# Patient Record
Sex: FEMALE | Race: WHITE | Employment: PART TIME | URBAN - METROPOLITAN AREA
[De-identification: names, ages, dates, MRNs, and addresses within clinical notes are randomized per-mention and may not be internally consistent; named-entity substitution may affect disease eponyms.]

---

## 2017-09-18 ENCOUNTER — HOSPITAL ENCOUNTER (OUTPATIENT)
Dept: DIABETES SERVICES | Age: 37
Discharge: HOME | End: 2017-09-18

## 2017-09-18 ENCOUNTER — HOSPITAL ENCOUNTER (OUTPATIENT)
Dept: OTHER | Age: 37
Discharge: HOME | End: 2017-09-30
Attending: FAMILY MEDICINE | Admitting: FAMILY MEDICINE

## 2017-09-18 DIAGNOSIS — O24.419 GESTATIONAL DIABETES MELLITUS (GDM) IN THIRD TRIMESTER, GESTATIONAL DIABETES METHOD OF CONTROL UNSPECIFIED: Primary | ICD-10-CM

## 2017-09-18 ASSESSMENT — PATIENT HEALTH QUESTIONNAIRE - PHQ9
SUM OF ALL RESPONSES TO PHQ9 QUESTIONS 1 & 2: 3
SUM OF ALL RESPONSES TO PHQ QUESTIONS 1-9: 3
2. FEELING DOWN, DEPRESSED OR HOPELESS: 1
1. LITTLE INTEREST OR PLEASURE IN DOING THINGS: 2

## 2017-09-25 ENCOUNTER — HOSPITAL ENCOUNTER (OUTPATIENT)
Dept: ONCOLOGY | Age: 37
Discharge: HOME | End: 2017-09-25
Attending: OBSTETRICS & GYNECOLOGY | Admitting: OBSTETRICS & GYNECOLOGY

## 2017-09-25 ENCOUNTER — HOSPITAL ENCOUNTER (OUTPATIENT)
Dept: DIABETES SERVICES | Age: 37
Discharge: HOME | End: 2017-09-25

## 2017-09-25 VITALS
DIASTOLIC BLOOD PRESSURE: 79 MMHG | HEART RATE: 87 BPM | RESPIRATION RATE: 87 BRPM | WEIGHT: 202 LBS | BODY MASS INDEX: 29.92 KG/M2 | TEMPERATURE: 98.4 F | HEIGHT: 69 IN | SYSTOLIC BLOOD PRESSURE: 124 MMHG

## 2017-09-25 DIAGNOSIS — O24.419 GESTATIONAL DIABETES MELLITUS (GDM) IN THIRD TRIMESTER, GESTATIONAL DIABETES METHOD OF CONTROL UNSPECIFIED: Primary | ICD-10-CM

## 2017-09-25 LAB — RHIG LOT NUMBER: NORMAL

## 2017-09-25 ASSESSMENT — PAIN - FUNCTIONAL ASSESSMENT: PAIN_FUNCTIONAL_ASSESSMENT: 0-10

## 2017-11-01 ENCOUNTER — HOSPITAL ENCOUNTER (OUTPATIENT)
Dept: OTHER | Age: 37
Discharge: HOME | End: 2017-11-30
Attending: FAMILY MEDICINE | Admitting: FAMILY MEDICINE

## 2017-11-15 ENCOUNTER — HOSPITAL ENCOUNTER (OUTPATIENT)
Dept: OBGYN | Age: 37
Discharge: HOME | End: 2017-11-30
Attending: OBSTETRICS & GYNECOLOGY | Admitting: OBSTETRICS & GYNECOLOGY

## 2017-12-01 ENCOUNTER — HOSPITAL ENCOUNTER (OUTPATIENT)
Dept: OTHER | Age: 37
Discharge: HOME | End: 2017-12-31
Attending: OBSTETRICS & GYNECOLOGY | Admitting: OBSTETRICS & GYNECOLOGY

## 2019-06-04 ENCOUNTER — OFFICE VISIT (OUTPATIENT)
Dept: FAMILY MEDICINE CLINIC | Age: 39
End: 2019-06-04
Payer: COMMERCIAL

## 2019-06-04 VITALS
RESPIRATION RATE: 16 BRPM | HEART RATE: 76 BPM | DIASTOLIC BLOOD PRESSURE: 86 MMHG | HEIGHT: 69 IN | WEIGHT: 209.2 LBS | SYSTOLIC BLOOD PRESSURE: 126 MMHG | BODY MASS INDEX: 30.98 KG/M2 | OXYGEN SATURATION: 98 % | TEMPERATURE: 99.1 F

## 2019-06-04 DIAGNOSIS — G47.00 INSOMNIA, UNSPECIFIED TYPE: ICD-10-CM

## 2019-06-04 DIAGNOSIS — Z76.89 ENCOUNTER TO ESTABLISH CARE: Primary | ICD-10-CM

## 2019-06-04 DIAGNOSIS — J01.90 ACUTE BACTERIAL SINUSITIS: ICD-10-CM

## 2019-06-04 DIAGNOSIS — F41.9 ANXIETY: ICD-10-CM

## 2019-06-04 DIAGNOSIS — B96.89 ACUTE BACTERIAL SINUSITIS: ICD-10-CM

## 2019-06-04 PROCEDURE — 99204 OFFICE O/P NEW MOD 45 MIN: CPT | Performed by: NURSE PRACTITIONER

## 2019-06-04 RX ORDER — NORETHINDRONE ACETATE AND ETHINYL ESTRADIOL 1MG-20(21)
1 KIT ORAL DAILY
COMMUNITY

## 2019-06-04 RX ORDER — CITALOPRAM 40 MG/1
40 TABLET ORAL DAILY
Qty: 30 TABLET | Refills: 3 | Status: SHIPPED | OUTPATIENT
Start: 2019-06-04 | End: 2019-10-18 | Stop reason: SDUPTHER

## 2019-06-04 RX ORDER — AMOXICILLIN AND CLAVULANATE POTASSIUM 875; 125 MG/1; MG/1
1 TABLET, FILM COATED ORAL 2 TIMES DAILY
Qty: 20 TABLET | Refills: 0 | Status: SHIPPED | OUTPATIENT
Start: 2019-06-04 | End: 2019-06-14

## 2019-06-04 RX ORDER — HYDROXYZINE HYDROCHLORIDE 25 MG/1
25-50 TABLET, FILM COATED ORAL NIGHTLY
Qty: 30 TABLET | Refills: 0 | Status: SHIPPED | OUTPATIENT
Start: 2019-06-04 | End: 2019-07-04

## 2019-06-04 ASSESSMENT — ENCOUNTER SYMPTOMS
CONSTIPATION: 0
DIARRHEA: 0
ABDOMINAL PAIN: 0
EYE REDNESS: 0
EYE DISCHARGE: 0
SHORTNESS OF BREATH: 0
COLOR CHANGE: 0
EYE PAIN: 0
WHEEZING: 0
COUGH: 1
NAUSEA: 1
SORE THROAT: 1
RHINORRHEA: 1
VOMITING: 0

## 2019-06-04 ASSESSMENT — PATIENT HEALTH QUESTIONNAIRE - PHQ9
SUM OF ALL RESPONSES TO PHQ QUESTIONS 1-9: 0
2. FEELING DOWN, DEPRESSED OR HOPELESS: 0
SUM OF ALL RESPONSES TO PHQ9 QUESTIONS 1 & 2: 0
1. LITTLE INTEREST OR PLEASURE IN DOING THINGS: 0
SUM OF ALL RESPONSES TO PHQ QUESTIONS 1-9: 0

## 2019-06-04 NOTE — PROGRESS NOTES
(CELEXA) 40 MG tablet Take 1 tablet by mouth daily Yes JORDYN Leon CNP   amoxicillin-clavulanate (AUGMENTIN) 875-125 MG per tablet Take 1 tablet by mouth 2 times daily for 10 days Yes JORDYN Leon CNP   albuterol (PROVENTIL HFA;VENTOLIN HFA) 108 (90 BASE) MCG/ACT inhaler Inhale 2 puffs into the lungs every 6 hours as needed for Wheezing.  Yes Historical Provider, MD        Allergies   Allergen Reactions    No Known Allergies        Past Medical History:   Diagnosis Date    Anxiety     Asthma     Migraine     past hx due to BCP    Scoliosis        Past Surgical History:   Procedure Laterality Date     SECTION  2017    TONSILLECTOMY      TONSILLECTOMY         Social History     Socioeconomic History    Marital status:      Spouse name: Not on file    Number of children: 0    Years of education: Not on file    Highest education level: Not on file   Occupational History    Occupation:      Employer: [employer]    Financial resource strain: Not on file    Food insecurity:     Worry: Not on file     Inability: Not on file    Transportation needs:     Medical: Not on file     Non-medical: Not on file   Tobacco Use    Smoking status: Never Smoker    Smokeless tobacco: Never Used   Substance and Sexual Activity    Alcohol use: No     Comment: 1-2 X per year    Drug use: No    Sexual activity: Yes     Partners: Male   Lifestyle    Physical activity:     Days per week: Not on file     Minutes per session: Not on file    Stress: Not on file   Relationships    Social connections:     Talks on phone: Not on file     Gets together: Not on file     Attends Mandaeism service: Not on file     Active member of club or organization: Not on file     Attends meetings of clubs or organizations: Not on file     Relationship status: Not on file    Intimate partner violence:     Fear of current or ex partner: Not on file     Emotionally abused: Not on file     Physically abused: Not on file     Forced sexual activity: Not on file   Other Topics Concern    Not on file   Social History Narrative    Not on file        Family History   Problem Relation Age of Onset    Early Death Paternal Uncle     Early Death Paternal Grandfather     High Blood Pressure Mother     High Blood Pressure Father     Stroke Maternal Aunt     Diabetes Maternal Uncle     Cancer Maternal Grandmother         lymphoma    Diabetes Maternal Grandfather        Vitals:    06/04/19 0959   BP: 126/86   Pulse: 76   Resp: 16   Temp: 99.1 °F (37.3 °C)   SpO2: 98%   Weight: 209 lb 3.2 oz (94.9 kg)   Height: 5' 9\" (1.753 m)     Estimated body mass index is 30.89 kg/m² as calculated from the following:    Height as of this encounter: 5' 9\" (1.753 m). Weight as of this encounter: 209 lb 3.2 oz (94.9 kg). Physical Exam   Constitutional: She is oriented to person, place, and time. She appears well-developed and well-nourished. HENT:   Head: Normocephalic. Right Ear: Hearing, tympanic membrane, external ear and ear canal normal.   Left Ear: Hearing, tympanic membrane, external ear and ear canal normal.   Nose: Mucosal edema present. Right sinus exhibits maxillary sinus tenderness. Right sinus exhibits no frontal sinus tenderness. Left sinus exhibits no maxillary sinus tenderness and no frontal sinus tenderness. Mouth/Throat: Uvula is midline and mucous membranes are normal. Posterior oropharyngeal erythema present. No oropharyngeal exudate or posterior oropharyngeal edema. No tonsillar exudate. Cardiovascular: Normal rate and regular rhythm. Pulmonary/Chest: Effort normal and breath sounds normal. She has no wheezes. She exhibits no tenderness. Abdominal: Soft. Bowel sounds are normal.   Neurological: She is alert and oriented to person, place, and time. Skin: Skin is warm and dry. Psychiatric: She has a normal mood and affect.  Her behavior is normal.   Vitals reviewed. ASSESSMENT/PLAN:  1. Encounter to establish care  See below    2. Acute bacterial sinusitis  Will treat with Augmentin, side effects discussed. - amoxicillin-clavulanate (AUGMENTIN) 875-125 MG per tablet; Take 1 tablet by mouth 2 times daily for 10 days  Dispense: 20 tablet; Refill: 0    3. Anxiety  Stable, continue celexa daily  - citalopram (CELEXA) 40 MG tablet; Take 1 tablet by mouth daily  Dispense: 30 tablet; Refill: 3    4. Insomnia, unspecified type  Will try hydroyzine for sleep. Will follow up in 1 month. - hydrOXYzine (ATARAX) 25 MG tablet; Take 1-2 tablets by mouth nightly  Dispense: 30 tablet; Refill: 0      Return in about 1 month (around 7/4/2019) for wellness visit. An  electronic signature was used to authenticate this note.     --JORDYN Lr - CNP on 6/4/2019 at 11:16 AM

## 2019-07-11 ENCOUNTER — OFFICE VISIT (OUTPATIENT)
Dept: FAMILY MEDICINE CLINIC | Age: 39
End: 2019-07-11
Payer: COMMERCIAL

## 2019-07-11 VITALS
HEART RATE: 75 BPM | BODY MASS INDEX: 31.19 KG/M2 | OXYGEN SATURATION: 98 % | DIASTOLIC BLOOD PRESSURE: 86 MMHG | SYSTOLIC BLOOD PRESSURE: 126 MMHG | WEIGHT: 211.2 LBS | RESPIRATION RATE: 16 BRPM

## 2019-07-11 DIAGNOSIS — G47.00 INSOMNIA, UNSPECIFIED TYPE: ICD-10-CM

## 2019-07-11 DIAGNOSIS — Z00.00 WELLNESS EXAMINATION: Primary | ICD-10-CM

## 2019-07-11 PROBLEM — F41.9 ANXIETY AND DEPRESSION: Status: ACTIVE | Noted: 2019-07-11

## 2019-07-11 PROBLEM — F32.A ANXIETY AND DEPRESSION: Status: ACTIVE | Noted: 2019-07-11

## 2019-07-11 LAB
A/G RATIO: 1.1 (ref 1.1–2.2)
ALBUMIN SERPL-MCNC: 4.1 G/DL (ref 3.4–5)
ALP BLD-CCNC: 80 U/L (ref 40–129)
ALT SERPL-CCNC: 19 U/L (ref 10–40)
ANION GAP SERPL CALCULATED.3IONS-SCNC: 14 MMOL/L (ref 3–16)
AST SERPL-CCNC: 34 U/L (ref 15–37)
BILIRUB SERPL-MCNC: 0.3 MG/DL (ref 0–1)
BUN BLDV-MCNC: 10 MG/DL (ref 7–20)
CALCIUM SERPL-MCNC: 9.4 MG/DL (ref 8.3–10.6)
CHLORIDE BLD-SCNC: 104 MMOL/L (ref 99–110)
CHOLESTEROL, TOTAL: 179 MG/DL (ref 0–199)
CO2: 23 MMOL/L (ref 21–32)
CREAT SERPL-MCNC: 0.6 MG/DL (ref 0.6–1.1)
GFR AFRICAN AMERICAN: >60
GFR NON-AFRICAN AMERICAN: >60
GLOBULIN: 3.7 G/DL
GLUCOSE BLD-MCNC: 112 MG/DL (ref 70–99)
HDLC SERPL-MCNC: 45 MG/DL (ref 40–60)
LDL CHOLESTEROL CALCULATED: 105 MG/DL
POTASSIUM SERPL-SCNC: 4.1 MMOL/L (ref 3.5–5.1)
SODIUM BLD-SCNC: 141 MMOL/L (ref 136–145)
TOTAL PROTEIN: 7.8 G/DL (ref 6.4–8.2)
TRIGL SERPL-MCNC: 143 MG/DL (ref 0–150)
VLDLC SERPL CALC-MCNC: 29 MG/DL

## 2019-07-11 PROCEDURE — 99395 PREV VISIT EST AGE 18-39: CPT | Performed by: NURSE PRACTITIONER

## 2019-07-11 PROCEDURE — 36415 COLL VENOUS BLD VENIPUNCTURE: CPT | Performed by: NURSE PRACTITIONER

## 2019-07-11 RX ORDER — TRAZODONE HYDROCHLORIDE 50 MG/1
50 TABLET ORAL NIGHTLY PRN
Qty: 30 TABLET | Refills: 0 | Status: SHIPPED | OUTPATIENT
Start: 2019-07-11 | End: 2019-08-09

## 2019-07-11 ASSESSMENT — ENCOUNTER SYMPTOMS
RESPIRATORY NEGATIVE: 1
GASTROINTESTINAL NEGATIVE: 1

## 2019-07-12 DIAGNOSIS — R73.09 ELEVATED GLUCOSE: Primary | ICD-10-CM

## 2019-07-12 DIAGNOSIS — R73.09 ELEVATED GLUCOSE: ICD-10-CM

## 2019-07-12 LAB
ESTIMATED AVERAGE GLUCOSE: 125.5 MG/DL
HBA1C MFR BLD: 6 %

## 2019-07-18 ENCOUNTER — PATIENT MESSAGE (OUTPATIENT)
Dept: FAMILY MEDICINE CLINIC | Age: 39
End: 2019-07-18

## 2019-07-18 RX ORDER — AMITRIPTYLINE HYDROCHLORIDE 10 MG/1
10 TABLET, FILM COATED ORAL NIGHTLY
Qty: 30 TABLET | Refills: 0 | Status: SHIPPED | OUTPATIENT
Start: 2019-07-18 | End: 2019-08-09 | Stop reason: DRUGHIGH

## 2019-07-29 ENCOUNTER — PATIENT MESSAGE (OUTPATIENT)
Dept: FAMILY MEDICINE CLINIC | Age: 39
End: 2019-07-29

## 2019-08-08 ENCOUNTER — PATIENT MESSAGE (OUTPATIENT)
Dept: FAMILY MEDICINE CLINIC | Age: 39
End: 2019-08-08

## 2019-08-09 RX ORDER — AMITRIPTYLINE HYDROCHLORIDE 50 MG/1
50 TABLET, FILM COATED ORAL NIGHTLY
Qty: 30 TABLET | Refills: 5 | Status: SHIPPED | OUTPATIENT
Start: 2019-08-09 | End: 2019-09-10 | Stop reason: DRUGHIGH

## 2019-09-10 ENCOUNTER — OFFICE VISIT (OUTPATIENT)
Dept: FAMILY MEDICINE CLINIC | Age: 39
End: 2019-09-10
Payer: COMMERCIAL

## 2019-09-10 VITALS
RESPIRATION RATE: 16 BRPM | WEIGHT: 216.4 LBS | HEART RATE: 92 BPM | DIASTOLIC BLOOD PRESSURE: 88 MMHG | BODY MASS INDEX: 31.96 KG/M2 | SYSTOLIC BLOOD PRESSURE: 132 MMHG | OXYGEN SATURATION: 98 %

## 2019-09-10 DIAGNOSIS — M65.312 TRIGGER THUMB OF LEFT HAND: Primary | ICD-10-CM

## 2019-09-10 DIAGNOSIS — G47.00 INSOMNIA, UNSPECIFIED TYPE: ICD-10-CM

## 2019-09-10 PROCEDURE — 99213 OFFICE O/P EST LOW 20 MIN: CPT | Performed by: NURSE PRACTITIONER

## 2019-09-10 RX ORDER — DICLOFENAC SODIUM 75 MG/1
75 TABLET, DELAYED RELEASE ORAL 2 TIMES DAILY
Qty: 20 TABLET | Refills: 0 | Status: SHIPPED | OUTPATIENT
Start: 2019-09-10 | End: 2020-01-16 | Stop reason: CLARIF

## 2019-09-10 RX ORDER — AMITRIPTYLINE HYDROCHLORIDE 50 MG/1
75 TABLET, FILM COATED ORAL NIGHTLY
Qty: 45 TABLET | Refills: 2 | Status: SHIPPED | OUTPATIENT
Start: 2019-09-10 | End: 2020-01-31 | Stop reason: SDUPTHER

## 2019-09-10 ASSESSMENT — ENCOUNTER SYMPTOMS: RESPIRATORY NEGATIVE: 1

## 2019-09-25 ENCOUNTER — OFFICE VISIT (OUTPATIENT)
Dept: ORTHOPEDIC SURGERY | Age: 39
End: 2019-09-25
Payer: COMMERCIAL

## 2019-09-25 VITALS — HEIGHT: 69 IN | WEIGHT: 216.49 LBS | BODY MASS INDEX: 32.07 KG/M2

## 2019-09-25 DIAGNOSIS — M79.645 FINGER PAIN, LEFT: Primary | ICD-10-CM

## 2019-09-25 DIAGNOSIS — M65.312 TRIGGER FINGER OF LEFT THUMB: ICD-10-CM

## 2019-09-25 PROCEDURE — 20550 NJX 1 TENDON SHEATH/LIGAMENT: CPT | Performed by: ORTHOPAEDIC SURGERY

## 2019-09-25 PROCEDURE — 99243 OFF/OP CNSLTJ NEW/EST LOW 30: CPT | Performed by: ORTHOPAEDIC SURGERY

## 2019-09-25 RX ORDER — BETAMETHASONE SODIUM PHOSPHATE AND BETAMETHASONE ACETATE 3; 3 MG/ML; MG/ML
12 INJECTION, SUSPENSION INTRA-ARTICULAR; INTRALESIONAL; INTRAMUSCULAR; SOFT TISSUE ONCE
Status: COMPLETED | OUTPATIENT
Start: 2019-09-25 | End: 2019-09-25

## 2019-09-25 RX ADMIN — BETAMETHASONE SODIUM PHOSPHATE AND BETAMETHASONE ACETATE 12 MG: 3; 3 INJECTION, SUSPENSION INTRA-ARTICULAR; INTRALESIONAL; INTRAMUSCULAR; SOFT TISSUE at 09:56

## 2019-09-25 NOTE — PROGRESS NOTES
Social connections:     Talks on phone: None     Gets together: None     Attends Worship service: None     Active member of club or organization: None     Attends meetings of clubs or organizations: None     Relationship status: None    Intimate partner violence:     Fear of current or ex partner: None     Emotionally abused: None     Physically abused: None     Forced sexual activity: None   Other Topics Concern    None   Social History Narrative    None     Current Outpatient Medications   Medication Sig Dispense Refill    amitriptyline (ELAVIL) 50 MG tablet Take 1.5 tablets by mouth nightly 45 tablet 2    norethindrone-ethinyl estradiol (JUNEL FE 1/20) 1-20 MG-MCG per tablet Take 1 tablet by mouth daily      citalopram (CELEXA) 40 MG tablet Take 1 tablet by mouth daily 30 tablet 3    albuterol (PROVENTIL HFA;VENTOLIN HFA) 108 (90 BASE) MCG/ACT inhaler Inhale 2 puffs into the lungs every 6 hours as needed for Wheezing.  diclofenac (VOLTAREN) 75 MG EC tablet Take 1 tablet by mouth 2 times daily for 10 days 20 tablet 0     No current facility-administered medications for this visit. Allergies   Allergen Reactions    No Known Allergies        ROS:  ROS neg except for positives in HPI    PHYSICAL EXAMINATION:   Gen/Psych: Examination reveals a pleasant individual in no acute distress. The patient is oriented to time, place and person. The patient's mood and affect are appropriate. Lymph: The lymphatic examination bilaterally reveals all areas to be without enlargement or induration. Skin intact without lymphadenopathy, discoloration, or abnormal temperature. Vascular: There is intact, symmetric circulation in both upper extremities.     Musculoskeletal       Cervical spine, shoulders, elbows, wrist:  satisfactory baseline range of motion, strength, and stability bilaterally        right thumb:                               moderate  tenderness at the A1 pulley moderate triggering with flexion and     extension. All other fingers/thumbs:   No tenderness at A1 pulley, no triggering with                                                   flexion/extension, and no locking    Neurological:  Sensation is subjectively normal in hands bilaterally    Radiology:         Additional Studies:    IMPRESSION AND PLAN: Left trigger thumb    I discussed the entity of trigger finger with the patient. I fully outlined the mechanics behind the triggering and locking and appropriate conservative and surgical options. All their questions were answered fully. At this point the patient is symptomatic, and injection was recommended. Patient was in agreement, hopeful to avoid surgery at this stage. The risks and benefits of cortisone injection were discussed thoroughly. Risks discussed included infection, adverse drug reaction, increased pain post-injection, skin de-pigmentation, fatty atrophy, and persistent clinical symptoms despite injection. Use of ethyl chloride spray during injection to decrease injection site pain was discussed. The injection was given after cleansing the skin with alcohol. Left trigger thumb and flexor tendon sheath was injected with 1 cc of 1% lidocaine without epinephrine 1 cc of Celestone without difficulty. The patient tolerated the injection well and a Band-aid was placed. I would recommend some thumb immobilization at night. Follow-up if symptoms not resolved. We may consider repeat cortisone injection or proceeding with left trigger thumb surgical release. We appreciate patient referral    All questions and concerns were addressed today. Patient is in agreement with the plan. Christina Lopez MD  Hand & Upper Extremity Surgery  [address] [place] [address]  A partner of [facility] ([facility])        Please note that this transcription was created using voice recognition software.   Any errors are unintentional and may be

## 2019-10-18 DIAGNOSIS — F41.9 ANXIETY: ICD-10-CM

## 2019-10-18 RX ORDER — CITALOPRAM 40 MG/1
40 TABLET ORAL DAILY
Qty: 30 TABLET | Refills: 2 | Status: SHIPPED | OUTPATIENT
Start: 2019-10-18 | End: 2020-01-16 | Stop reason: SDUPTHER

## 2020-01-16 ENCOUNTER — OFFICE VISIT (OUTPATIENT)
Dept: FAMILY MEDICINE CLINIC | Age: 40
End: 2020-01-16
Payer: COMMERCIAL

## 2020-01-16 VITALS
WEIGHT: 221 LBS | OXYGEN SATURATION: 98 % | HEART RATE: 90 BPM | TEMPERATURE: 98.3 F | SYSTOLIC BLOOD PRESSURE: 120 MMHG | DIASTOLIC BLOOD PRESSURE: 88 MMHG | BODY MASS INDEX: 32.62 KG/M2

## 2020-01-16 LAB — HBA1C MFR BLD: 6.4 %

## 2020-01-16 PROCEDURE — 99214 OFFICE O/P EST MOD 30 MIN: CPT | Performed by: NURSE PRACTITIONER

## 2020-01-16 PROCEDURE — 83036 HEMOGLOBIN GLYCOSYLATED A1C: CPT | Performed by: NURSE PRACTITIONER

## 2020-01-16 RX ORDER — CITALOPRAM 40 MG/1
40 TABLET ORAL DAILY
Qty: 30 TABLET | Refills: 2 | Status: SHIPPED | OUTPATIENT
Start: 2020-01-16 | End: 2020-05-13 | Stop reason: SDUPTHER

## 2020-01-16 ASSESSMENT — ENCOUNTER SYMPTOMS
WHEEZING: 0
SHORTNESS OF BREATH: 0

## 2020-01-16 NOTE — PROGRESS NOTES
Patient: Bubba Knowles is a 46 y.o. female who presents today with the following Chief Complaint(s):  Chief Complaint   Patient presents with    6 Month Follow-Up         HPI   April is a 45 yo female who is here for 6 month follow up for anxiety and A1C. Anxiety: taking celexa 40mg- working well for her mood  Insomnia: taking amitriptyline as needed  A1C last time was 6.0- has not been eating well  Had gestational diabetes     Current Outpatient Medications   Medication Sig Dispense Refill    citalopram (CELEXA) 40 MG tablet Take 1 tablet by mouth daily 30 tablet 2    amitriptyline (ELAVIL) 50 MG tablet Take 1.5 tablets by mouth nightly 45 tablet 2    albuterol (PROVENTIL HFA;VENTOLIN HFA) 108 (90 BASE) MCG/ACT inhaler Inhale 2 puffs into the lungs every 6 hours as needed for Wheezing.  norethindrone-ethinyl estradiol (JUNEL FE 1/20) 1-20 MG-MCG per tablet Take 1 tablet by mouth daily       No current facility-administered medications for this visit. Patient's past medical history, surgical history, family history, medications,  andallergies  were all reviewed and updated as appropriate today. Review of Systems   Eyes: Negative for visual disturbance. Respiratory: Negative for shortness of breath and wheezing. Cardiovascular: Negative for chest pain and palpitations. Endocrine: Negative for polydipsia, polyphagia and polyuria. Genitourinary: Negative for dysuria, frequency and urgency. Neurological: Negative for numbness. Psychiatric/Behavioral: Positive for sleep disturbance. Negative for dysphoric mood and suicidal ideas. The patient is not nervous/anxious. Physical Exam  Vitals signs and nursing note reviewed. Constitutional:       Appearance: She is well-developed. HENT:      Head: Normocephalic and atraumatic. Eyes:      Conjunctiva/sclera: Conjunctivae normal.   Neck:      Musculoskeletal: Normal range of motion and neck supple.    Cardiovascular: Rate and Rhythm: Normal rate and regular rhythm. Heart sounds: Normal heart sounds. No murmur. Pulmonary:      Effort: Pulmonary effort is normal. No respiratory distress. Breath sounds: Normal breath sounds. No wheezing or rales. Musculoskeletal: Normal range of motion. Lymphadenopathy:      Cervical: No cervical adenopathy. Skin:     General: Skin is warm and dry. Neurological:      General: No focal deficit present. Mental Status: She is alert and oriented to person, place, and time. Deep Tendon Reflexes: Reflexes are normal and symmetric. Psychiatric:         Mood and Affect: Mood normal.         Behavior: Behavior normal.         Thought Content: Thought content normal.         Judgment: Judgment normal.       Vitals:    01/16/20 1021   BP: 120/88   Pulse:    Temp:    SpO2:        Assessment:  Encounter Diagnoses   Name Primary?  Anxiety Yes    Insomnia, unspecified type     Prediabetes        Plan:  1. Anxiety    - citalopram (CELEXA) 40 MG tablet; Take 1 tablet by mouth daily  Dispense: 30 tablet; Refill: 2    2. Insomnia, unspecified type  Amitriptyline as needed    3. Prediabetes    - POCT glycosylated hemoglobin (Hb A1C) 6.4  Discussed that she really needs to watch diet- 6.5 is diagnostic for diabetes  Gave information on healthy eating  Discussed if it goes up next time it will be suggested that she start medication          Return in about 3 months (around 4/16/2020) for check A1C.

## 2020-01-22 ENCOUNTER — OFFICE VISIT (OUTPATIENT)
Dept: ORTHOPEDIC SURGERY | Age: 40
End: 2020-01-22
Payer: COMMERCIAL

## 2020-01-22 VITALS — WEIGHT: 220.9 LBS | HEIGHT: 69 IN | BODY MASS INDEX: 32.72 KG/M2

## 2020-01-22 PROCEDURE — 20550 NJX 1 TENDON SHEATH/LIGAMENT: CPT | Performed by: ORTHOPAEDIC SURGERY

## 2020-01-22 PROCEDURE — 99213 OFFICE O/P EST LOW 20 MIN: CPT | Performed by: ORTHOPAEDIC SURGERY

## 2020-01-22 RX ORDER — BETAMETHASONE SODIUM PHOSPHATE AND BETAMETHASONE ACETATE 3; 3 MG/ML; MG/ML
12 INJECTION, SUSPENSION INTRA-ARTICULAR; INTRALESIONAL; INTRAMUSCULAR; SOFT TISSUE ONCE
Status: COMPLETED | OUTPATIENT
Start: 2020-01-22 | End: 2020-01-22

## 2020-01-22 RX ADMIN — BETAMETHASONE SODIUM PHOSPHATE AND BETAMETHASONE ACETATE 12 MG: 3; 3 INJECTION, SUSPENSION INTRA-ARTICULAR; INTRALESIONAL; INTRAMUSCULAR; SOFT TISSUE at 15:01

## 2020-01-22 NOTE — PROGRESS NOTES
Chief Complaint   Patient presents with    Follow-up     left thumb: trigger  would like an injection        HISTORY OF PRESENT ILLNESS:  April M Darcie Corley is a 46 y.o.  patient here for repeat for discomfort and triggering of the left thumb. We provided a cortisone injection approximately 4 months ago. Symptoms have returned. No new injury reported. ROS:  ROS neg     Past medical history is reviewed again today, no changes to report    PHYSICAL EXAMINATION:  Patient is alert and pleasant, in no acute distress. The affected extremity is examined today. Left thumb reveals clicking over the A1 pulley without locking. Mild  weakness. No prior injection site change. Thumb is warm and sensate      IMPRESSION AND PLAN: Left trigger thumb  We will continue with our current care plan. We offered a repeat cortisone injection today rather than surgery, per her request.  The risks and benefits of cortisone injection were discussed thoroughly. Risks discussed included infection, adverse drug reaction, increased pain post-injection, skin de-pigmentation, fatty atrophy, and persistent clinical symptoms despite injection. Use of ethyl chloride spray during injection to decrease injection site pain was discussed. The injection was given after cleansing the skin with alcohol. Left trigger thumb and flexor tendon sheath was injected with 1 cc of 1% lidocaine without epinephrine and 1 cc of Celestone without difficulty. Nice filling of the tendon sheath. The patient tolerated the injection well and a Band-aid was placed. Follow-up if symptoms do not resolve  All questions and concerns were addressed today. Patient is in agreement with the plan. Ghazal Fuentes MD  Hand & Upper Extremity Surgery  [address]  A partner of [facility] ([facility])        Please note that this transcription was created using voice recognition software.   Any errors are unintentional and may

## 2020-01-31 ENCOUNTER — PATIENT MESSAGE (OUTPATIENT)
Dept: FAMILY MEDICINE CLINIC | Age: 40
End: 2020-01-31

## 2020-01-31 RX ORDER — AMITRIPTYLINE HYDROCHLORIDE 50 MG/1
75 TABLET, FILM COATED ORAL NIGHTLY
Qty: 45 TABLET | Refills: 5 | Status: SHIPPED | OUTPATIENT
Start: 2020-01-31 | End: 2020-07-14 | Stop reason: DRUGHIGH

## 2020-01-31 NOTE — TELEPHONE ENCOUNTER
From: Jazmine Urrutia  To: Jesica Wooten, APRN - CNP  Sent: 1/31/2020 1:08 PM EST  Subject: Non-Urgent Medical Question    Mariusz Leigh,   I need a refill on the Amitriptyline. Thank you.

## 2020-04-06 ENCOUNTER — PATIENT MESSAGE (OUTPATIENT)
Dept: FAMILY MEDICINE CLINIC | Age: 40
End: 2020-04-06

## 2020-04-06 NOTE — TELEPHONE ENCOUNTER
From: Jazmine Urrutia  To: JORDYN Rivero - CNP  Sent: 4/6/2020 5:30 PM EDT  Subject: Non-Urgent Medical Question    I have an appointment on April 16 to test my A1C. Since the office is closed, should I go to another location or schedule a later date?    Thanks,  April

## 2020-04-09 ENCOUNTER — TELEPHONE (OUTPATIENT)
Dept: FAMILY MEDICINE CLINIC | Age: 40
End: 2020-04-09

## 2020-04-13 ENCOUNTER — TELEPHONE (OUTPATIENT)
Dept: FAMILY MEDICINE CLINIC | Age: 40
End: 2020-04-13

## 2020-04-16 ENCOUNTER — TELEMEDICINE (OUTPATIENT)
Dept: FAMILY MEDICINE CLINIC | Age: 40
End: 2020-04-16
Payer: COMMERCIAL

## 2020-04-16 PROCEDURE — 99213 OFFICE O/P EST LOW 20 MIN: CPT | Performed by: NURSE PRACTITIONER

## 2020-04-16 RX ORDER — HYDROXYZINE HYDROCHLORIDE 25 MG/1
25 TABLET, FILM COATED ORAL NIGHTLY
Qty: 30 TABLET | Refills: 0 | Status: SHIPPED | OUTPATIENT
Start: 2020-04-16 | End: 2020-05-20 | Stop reason: SDUPTHER

## 2020-04-16 ASSESSMENT — ENCOUNTER SYMPTOMS
GASTROINTESTINAL NEGATIVE: 1
RESPIRATORY NEGATIVE: 1

## 2020-04-16 NOTE — PROGRESS NOTES
2020    TELEHEALTH EVALUATION -- Audio/Visual (During ROYRD-64 public health emergency)    HPI:    Jazmine Urrutia (:  1980) has requested an audio/video evaluation for the following concern(s):    Patient has concerns with not sleeping well at night. She has been on amitriptyline 100mg for a couple of months now. She used to work 3rd shift and she thinks that may be contributing to her sleeping issues. She has only been sleeping for about 2 hours and then she is awake the rest of the night. She has tried Trazodone in the past and she did not tolerate it well. She has been taking naps in the morning when her  gets up with her daughter. Review of Systems   Constitutional: Positive for fatigue. HENT: Negative. Respiratory: Negative. Cardiovascular: Negative. Gastrointestinal: Negative. Musculoskeletal: Negative. Neurological: Negative. Psychiatric/Behavioral: Positive for sleep disturbance. Negative for self-injury and suicidal ideas. The patient is not nervous/anxious. Prior to Visit Medications    Medication Sig Taking? Authorizing Provider   hydrOXYzine (ATARAX) 25 MG tablet Take 1 tablet by mouth nightly Yes JORDYN Covington CNP   amitriptyline (ELAVIL) 50 MG tablet Take 1.5 tablets by mouth nightly  JORDYN Lubin CNP   citalopram (CELEXA) 40 MG tablet Take 1 tablet by mouth daily  JORDYN Martinez CNP   norethindrone-ethinyl estradiol (JUNEL FE 1/20) 1-20 MG-MCG per tablet Take 1 tablet by mouth daily  Historical Provider, MD   albuterol (PROVENTIL HFA;VENTOLIN HFA) 108 (90 BASE) MCG/ACT inhaler Inhale 2 puffs into the lungs every 6 hours as needed for Wheezing. Historical Provider, MD       Social History     Tobacco Use    Smoking status: Never Smoker    Smokeless tobacco: Never Used   Substance Use Topics    Alcohol use: No     Comment: 1-2 X per year    Drug use:  No            PHYSICAL EXAMINATION:  [ INSTRUCTIONS:  \"[x]\" Indicates a positive item  \"[]\" Indicates a negative item  -- DELETE ALL ITEMS NOT EXAMINED]  Vital Signs: (As obtained by patient/caregiver or practitioner observation)    Blood pressure-  Heart rate-    Respiratory rate-    Temperature-  Pulse oximetry-     Constitutional: [x] Appears well-developed and well-nourished [x] No apparent distress      [] Abnormal-   Mental status  [x] Alert and awake  [x] Oriented to person/place/time []Able to follow commands      Eyes:  EOM    []  Normal  [] Abnormal-  Sclera  []  Normal  [] Abnormal -         Discharge []  None visible  [] Abnormal -    HENT:   [x] Normocephalic, atraumatic. [] Abnormal   [x] Mouth/Throat: Mucous membranes are moist.     External Ears [] Normal  [] Abnormal-     Neck: [x] No visualized mass     Pulmonary/Chest: [x] Respiratory effort normal.  [x] No visualized signs of difficulty breathing or respiratory distress        [] Abnormal-      Musculoskeletal:   [] Normal gait with no signs of ataxia         [] Normal range of motion of neck        [] Abnormal-       Neurological:        [] No Facial Asymmetry (Cranial nerve 7 motor function) (limited exam to video visit)          [] No gaze palsy        [] Abnormal-         Skin:        [x] No significant exanthematous lesions or discoloration noted on facial skin         [] Abnormal-            Psychiatric:       [x] Normal Affect [] No Hallucinations        [] Abnormal-     Other pertinent observable physical exam findings-     ASSESSMENT/PLAN:  1. Insomnia, unspecified type  Will continue with amitriptyline nightly and add hydroxyzine nightly. Discussed good sleep hygiene and limiting caffeine intake after lunch. Will follow up again in office in June. - hydrOXYzine (ATARAX) 25 MG tablet; Take 1 tablet by mouth nightly  Dispense: 30 tablet;  Refill: 0          April M Henrietta Sanchez is a 46 y.o. female being evaluated by a Virtual Visit (video visit) encounter to address concerns as through a video synchronous discussion virtually to substitute for in-person clinic visit. I discussed with the patient the nature of our telehealth visits via interactive/real-time audio/video that:   - I would evaluate the patient and recommend diagnostics and treatments based on my assessment   - Our sessions are not being recorded and that personal health information is protected   - Our team would provide follow up care in person if/when the patient needs it.

## 2020-05-13 ENCOUNTER — PATIENT MESSAGE (OUTPATIENT)
Dept: FAMILY MEDICINE CLINIC | Age: 40
End: 2020-05-13

## 2020-05-13 RX ORDER — CITALOPRAM 40 MG/1
40 TABLET ORAL DAILY
Qty: 30 TABLET | Refills: 2 | Status: SHIPPED | OUTPATIENT
Start: 2020-05-13 | End: 2020-07-14 | Stop reason: SDUPTHER

## 2020-05-18 ENCOUNTER — OFFICE VISIT (OUTPATIENT)
Dept: ORTHOPEDIC SURGERY | Age: 40
End: 2020-05-18
Payer: COMMERCIAL

## 2020-05-18 VITALS — HEIGHT: 69 IN | BODY MASS INDEX: 32.72 KG/M2 | WEIGHT: 220.9 LBS

## 2020-05-18 PROCEDURE — 99213 OFFICE O/P EST LOW 20 MIN: CPT | Performed by: ORTHOPAEDIC SURGERY

## 2020-05-18 PROCEDURE — 20550 NJX 1 TENDON SHEATH/LIGAMENT: CPT | Performed by: ORTHOPAEDIC SURGERY

## 2020-05-18 RX ORDER — LIDOCAINE HYDROCHLORIDE 10 MG/ML
20 INJECTION, SOLUTION INFILTRATION; PERINEURAL ONCE
Status: COMPLETED | OUTPATIENT
Start: 2020-05-18 | End: 2020-05-18

## 2020-05-18 RX ORDER — BETAMETHASONE SODIUM PHOSPHATE AND BETAMETHASONE ACETATE 3; 3 MG/ML; MG/ML
1 INJECTION, SUSPENSION INTRA-ARTICULAR; INTRALESIONAL; INTRAMUSCULAR; SOFT TISSUE ONCE
Status: COMPLETED | OUTPATIENT
Start: 2020-05-18 | End: 2020-05-18

## 2020-05-18 RX ADMIN — BETAMETHASONE SODIUM PHOSPHATE AND BETAMETHASONE ACETATE 1.02 MG: 3; 3 INJECTION, SUSPENSION INTRA-ARTICULAR; INTRALESIONAL; INTRAMUSCULAR; SOFT TISSUE at 15:49

## 2020-05-18 RX ADMIN — LIDOCAINE HYDROCHLORIDE 20 ML: 10 INJECTION, SOLUTION INFILTRATION; PERINEURAL at 15:49

## 2020-05-20 ENCOUNTER — PATIENT MESSAGE (OUTPATIENT)
Dept: FAMILY MEDICINE CLINIC | Age: 40
End: 2020-05-20

## 2020-05-20 RX ORDER — HYDROXYZINE HYDROCHLORIDE 25 MG/1
50 TABLET, FILM COATED ORAL NIGHTLY
Qty: 60 TABLET | Refills: 3 | Status: SHIPPED | OUTPATIENT
Start: 2020-05-20 | End: 2020-06-19

## 2020-07-01 ENCOUNTER — TELEPHONE (OUTPATIENT)
Dept: FAMILY MEDICINE CLINIC | Age: 40
End: 2020-07-01

## 2020-07-01 NOTE — TELEPHONE ENCOUNTER
[facility] received a call from:    Name of Caller: Patient    Best contact number: [phone]    Reason for call: Patient needs a visit to have A1C checked, please call patient to schedule

## 2020-07-14 ENCOUNTER — OFFICE VISIT (OUTPATIENT)
Dept: FAMILY MEDICINE CLINIC | Age: 40
End: 2020-07-14
Payer: COMMERCIAL

## 2020-07-14 VITALS
WEIGHT: 228 LBS | TEMPERATURE: 97.5 F | SYSTOLIC BLOOD PRESSURE: 130 MMHG | OXYGEN SATURATION: 97 % | DIASTOLIC BLOOD PRESSURE: 88 MMHG | HEART RATE: 119 BPM | BODY MASS INDEX: 33.65 KG/M2

## 2020-07-14 PROCEDURE — 99214 OFFICE O/P EST MOD 30 MIN: CPT | Performed by: NURSE PRACTITIONER

## 2020-07-14 RX ORDER — AMITRIPTYLINE HYDROCHLORIDE 50 MG/1
50 TABLET, FILM COATED ORAL NIGHTLY
COMMUNITY
End: 2020-07-22

## 2020-07-14 RX ORDER — ZOLPIDEM TARTRATE 5 MG/1
5 TABLET ORAL NIGHTLY PRN
Qty: 30 TABLET | Refills: 0 | Status: SHIPPED | OUTPATIENT
Start: 2020-07-14 | End: 2020-08-14 | Stop reason: SDUPTHER

## 2020-07-14 RX ORDER — CITALOPRAM 40 MG/1
40 TABLET ORAL DAILY
Qty: 30 TABLET | Refills: 5 | Status: SHIPPED | OUTPATIENT
Start: 2020-07-14 | End: 2021-02-26

## 2020-07-14 ASSESSMENT — ENCOUNTER SYMPTOMS
RESPIRATORY NEGATIVE: 1
SHORTNESS OF BREATH: 0
COUGH: 0
GASTROINTESTINAL NEGATIVE: 1
WHEEZING: 0

## 2020-07-14 NOTE — PATIENT INSTRUCTIONS
Stop Hydroxyzine and decrease amitriptyline to 50 mg. Start Ambien 5 mg nightly. Follow up in 1 month.

## 2020-07-14 NOTE — PROGRESS NOTES
Social History     Tobacco Use    Smoking status: Never Smoker    Smokeless tobacco: Never Used   Substance Use Topics    Alcohol use: No     Comment: 1-2 X per year        Vitals:    07/14/20 1045   BP: 130/88   Pulse: 119   Temp: 97.5 °F (36.4 °C)   TempSrc: Temporal   SpO2: 97%   Weight: 228 lb (103.4 kg)     Estimated body mass index is 33.65 kg/m² as calculated from the following:    Height as of 5/18/20: 5' 9.02\" (1.753 m). Weight as of this encounter: 228 lb (103.4 kg). Physical Exam  Vitals signs reviewed. Neck:     Cardiovascular:      Rate and Rhythm: Normal rate and regular rhythm. Heart sounds: Normal heart sounds. Pulmonary:      Effort: Pulmonary effort is normal.      Breath sounds: Normal breath sounds. Abdominal:      General: Bowel sounds are normal.   Musculoskeletal:         General: Swelling (Plus 1 in bilateral lower extremities) present. Skin:     General: Skin is warm and dry. Neurological:      Mental Status: She is alert and oriented to person, place, and time. Psychiatric:         Mood and Affect: Mood normal.         Behavior: Behavior normal.         ASSESSMENT/PLAN:  1. Pre-diabetes  Will check blood work  - Hemoglobin A1C  - Comprehensive Metabolic Panel    2. Insomnia, unspecified type  Will start abien, she has tried multiple other medications. Side effects discussed. Patient verbalized her understanding. Will follow up in 1 months.   - zolpidem (AMBIEN) 5 MG tablet; Take 1 tablet by mouth nightly as needed for Sleep for up to 30 days. Dispense: 30 tablet; Refill: 0    3. Localized swelling, mass or lump of neck  Likely just fatty tissue deposit. Will further assess with  Ultrasound. - US HEAD NECK SOFT TISSUE THYROID; Future    4. Bilateral lower extremity edema  Educated patient to elevate legs during the day when she can and try compression stockings.        Follow up in 1 month    An electronic signature was used to authenticate this note.    --JORDYN Jenkins - CNP on 7/14/2020 at 1:08 PM

## 2020-07-15 LAB
A/G RATIO: 0.9 (ref 1.1–2.2)
ALBUMIN SERPL-MCNC: 3.7 G/DL (ref 3.4–5)
ALP BLD-CCNC: 97 U/L (ref 40–129)
ALT SERPL-CCNC: 48 U/L (ref 10–40)
ANION GAP SERPL CALCULATED.3IONS-SCNC: 15 MMOL/L (ref 3–16)
AST SERPL-CCNC: 88 U/L (ref 15–37)
BILIRUB SERPL-MCNC: 0.5 MG/DL (ref 0–1)
BUN BLDV-MCNC: 9 MG/DL (ref 7–20)
CALCIUM SERPL-MCNC: 9.1 MG/DL (ref 8.3–10.6)
CHLORIDE BLD-SCNC: 100 MMOL/L (ref 99–110)
CO2: 24 MMOL/L (ref 21–32)
CREAT SERPL-MCNC: 0.6 MG/DL (ref 0.6–1.1)
ESTIMATED AVERAGE GLUCOSE: 197.3 MG/DL
GFR AFRICAN AMERICAN: >60
GFR NON-AFRICAN AMERICAN: >60
GLOBULIN: 4.1 G/DL
GLUCOSE BLD-MCNC: 287 MG/DL (ref 70–99)
HBA1C MFR BLD: 8.5 %
POTASSIUM SERPL-SCNC: 3.6 MMOL/L (ref 3.5–5.1)
SODIUM BLD-SCNC: 139 MMOL/L (ref 136–145)
TOTAL PROTEIN: 7.8 G/DL (ref 6.4–8.2)

## 2020-07-15 RX ORDER — BLOOD PRESSURE TEST KIT
KIT MISCELLANEOUS
Qty: 100 EACH | Refills: 2 | Status: SHIPPED | OUTPATIENT
Start: 2020-07-15

## 2020-07-15 RX ORDER — LANCETS 30 GAUGE
EACH MISCELLANEOUS
Qty: 100 EACH | Refills: 2 | Status: SHIPPED | OUTPATIENT
Start: 2020-07-15

## 2020-07-15 RX ORDER — GLUCOSAMINE HCL/CHONDROITIN SU 500-400 MG
CAPSULE ORAL
Qty: 100 STRIP | Refills: 2 | Status: SHIPPED | OUTPATIENT
Start: 2020-07-15 | End: 2020-10-15

## 2020-07-23 ENCOUNTER — HOSPITAL ENCOUNTER (OUTPATIENT)
Dept: ULTRASOUND IMAGING | Age: 40
Discharge: HOME | End: 2020-07-23
Payer: COMMERCIAL

## 2020-07-23 PROCEDURE — 76536 US EXAM OF HEAD AND NECK: CPT

## 2020-08-14 ENCOUNTER — OFFICE VISIT (OUTPATIENT)
Dept: FAMILY MEDICINE CLINIC | Age: 40
End: 2020-08-14
Payer: COMMERCIAL

## 2020-08-14 VITALS
OXYGEN SATURATION: 97 % | WEIGHT: 215 LBS | HEART RATE: 94 BPM | TEMPERATURE: 97.1 F | SYSTOLIC BLOOD PRESSURE: 120 MMHG | BODY MASS INDEX: 31.74 KG/M2 | DIASTOLIC BLOOD PRESSURE: 82 MMHG

## 2020-08-14 PROCEDURE — 99213 OFFICE O/P EST LOW 20 MIN: CPT | Performed by: NURSE PRACTITIONER

## 2020-08-14 PROCEDURE — 3052F HG A1C>EQUAL 8.0%<EQUAL 9.0%: CPT | Performed by: NURSE PRACTITIONER

## 2020-08-14 RX ORDER — ZOLPIDEM TARTRATE 5 MG/1
5 TABLET ORAL NIGHTLY PRN
Qty: 30 TABLET | Refills: 2 | Status: SHIPPED | OUTPATIENT
Start: 2020-08-14 | End: 2020-09-13

## 2020-08-14 RX ORDER — ZOLPIDEM TARTRATE 5 MG/1
5 TABLET ORAL NIGHTLY PRN
COMMUNITY

## 2020-08-14 ASSESSMENT — ENCOUNTER SYMPTOMS
WHEEZING: 0
COUGH: 0
RESPIRATORY NEGATIVE: 1
SHORTNESS OF BREATH: 0
GASTROINTESTINAL NEGATIVE: 1

## 2020-08-14 NOTE — PROGRESS NOTES
2020 SD Urrutia (:  1980) is a 46 y.o. female, here for evaluation of the following medical concerns:    HPI   Insomnia- patient is not sure if the Ambien is helping or not. She states when she took the full pill it made her too drowsy the next day. When she was taking half a tablet she does not think it was working. Diabetes- she is concerned about her levels. She is taking her metformin twice daily. She has a history of gestational diabetes. Review of Systems   Constitutional: Negative. HENT: Negative. Respiratory: Negative. Negative for cough, shortness of breath and wheezing. Cardiovascular: Negative. Negative for chest pain and palpitations. Gastrointestinal: Negative. Musculoskeletal: Negative. Skin: Negative. Neurological: Negative. Psychiatric/Behavioral: Positive for sleep disturbance. Negative for self-injury and suicidal ideas. The patient is nervous/anxious. Prior to Visit Medications    Medication Sig Taking? Authorizing Provider   zolpidem (AMBIEN) 5 MG tablet Take 1 tablet by mouth nightly as needed for Sleep for up to 30 days. Yes JORDYN Christian CNP   zolpidem (AMBIEN) 5 MG tablet Take 5 mg by mouth nightly as needed for Sleep. Yes Historical Provider, MD   amitriptyline (ELAVIL) 50 MG tablet TAKE 1 AND 1/2 TABLETS BY MOUTH EVERY NIGHT Yes JORDYN Aparicio CNP   metFORMIN (GLUCOPHAGE) 500 MG tablet Take 1 tablet by mouth 2 times daily (with meals) Yes JORDYN Christian CNP   Alcohol Swabs PADS To check glucose 2  Times daily Yes JORDYN Christian CNP   blood glucose monitor kit and supplies Test 2 times a day & as needed for symptoms of irregular blood glucose. Yes JORDYN Christian CNP   blood glucose monitor strips Test 2 times a day & as needed for symptoms of irregular blood glucose.  Yes JORDYN Christian CNP   Lancets MISC To test glucose 2 times daily Yes JORDYN Christian CNP citalopram (CELEXA) 40 MG tablet Take 1 tablet by mouth daily Yes JORDYN Zepeda CNP   hydrocortisone 2.5 % cream Apply topically 2 times daily. Yes JORDYN Zepeda CNP   albuterol sulfate  (90 Base) MCG/ACT inhaler Inhale 2 puffs into the lungs every 6 hours as needed for Wheezing Yes JORDYN Zepeda CNP   norethindrone-ethinyl estradiol ([place] FE 1/20) 1-20 MG-MCG per tablet Take 1 tablet by mouth daily Yes Historical Provider, MD        Social History     Tobacco Use    Smoking status: Never Smoker    Smokeless tobacco: Never Used   Substance Use Topics    Alcohol use: No     Comment: 1-2 X per year        Vitals:    08/14/20 1050 08/14/20 1114   BP: (!) 144/90 120/82   Pulse: 94    Temp: 97.1 °F (36.2 °C)    TempSrc: Temporal    SpO2: 97%    Weight: 215 lb (97.5 kg)      Estimated body mass index is 31.74 kg/m² as calculated from the following:    Height as of 5/18/20: 5' 9.02\" (1.753 m). Weight as of this encounter: 215 lb (97.5 kg). Physical Exam  Vitals signs reviewed. Cardiovascular:      Rate and Rhythm: Normal rate and regular rhythm. Heart sounds: Normal heart sounds. Pulmonary:      Effort: Pulmonary effort is normal.      Breath sounds: Normal breath sounds. Abdominal:      General: Bowel sounds are normal.   Skin:     General: Skin is warm and dry. Neurological:      Mental Status: She is alert and oriented to person, place, and time. Psychiatric:         Mood and Affect: Mood normal.         Speech: Speech normal.         Behavior: Behavior normal.         ASSESSMENT/PLAN:  1. Chronic insomnia  Refill given of ambien, patient advised to give it some time for her body to adjust to it. If no improvement then she should see sleep medicine for further intervention. - zolpidem (AMBIEN) 5 MG tablet; Take 1 tablet by mouth nightly as needed for Sleep for up to 30 days. Dispense: 30 tablet;  Refill: 2  - Christen Duarte MD, Sleep Medicine, [facility]    2. Type 2 diabetes mellitus without complication, without long-term current use of insulin (HCC)  Continue current medications. Home BS readings look good. Return in about 3 months (around 11/14/2020) for follow up diabetes. An electronic signature was used to authenticate this note.     --JORDYN Kaufman - CNP on 8/14/2020 at 1:11 PM

## 2020-09-16 ENCOUNTER — PATIENT MESSAGE (OUTPATIENT)
Dept: FAMILY MEDICINE CLINIC | Age: 40
End: 2020-09-16

## 2020-09-16 RX ORDER — DEXTROMETHORPHAN HYDROBROMIDE AND PROMETHAZINE HYDROCHLORIDE 15; 6.25 MG/5ML; MG/5ML
5 SYRUP ORAL 4 TIMES DAILY PRN
Qty: 118 ML | Refills: 0 | Status: SHIPPED | OUTPATIENT
Start: 2020-09-16 | End: 2020-11-16

## 2020-09-16 NOTE — TELEPHONE ENCOUNTER
From: April SD Urrutia  To: Ginny Dodson, JORDYN - CNP  Sent: 9/16/2020 11:02 AM EDT  Subject: Prescription Question    Hi Sayra,  I've got a cold. It started out as a sore throat, now its coughing. I cough so much that Im having problems trying to go to sleep. I've tried using that Tussin Diabetic cough/cold, but its not working. I'm also so sleepy, but cant. My throat was fine, but now it hurts due to my coughing. Is there some prescription that I can take for it?    Thank You

## 2020-10-15 RX ORDER — BLOOD SUGAR DIAGNOSTIC
STRIP MISCELLANEOUS
Qty: 100 STRIP | Refills: 2 | Status: SHIPPED | OUTPATIENT
Start: 2020-10-15 | End: 2021-06-21

## 2020-11-16 ENCOUNTER — OFFICE VISIT (OUTPATIENT)
Dept: FAMILY MEDICINE CLINIC | Age: 40
End: 2020-11-16
Payer: COMMERCIAL

## 2020-11-16 VITALS
OXYGEN SATURATION: 97 % | HEART RATE: 88 BPM | DIASTOLIC BLOOD PRESSURE: 88 MMHG | BODY MASS INDEX: 31.59 KG/M2 | TEMPERATURE: 97.7 F | WEIGHT: 214 LBS | SYSTOLIC BLOOD PRESSURE: 130 MMHG

## 2020-11-16 PROBLEM — E11.9 TYPE 2 DIABETES MELLITUS WITHOUT COMPLICATION, WITHOUT LONG-TERM CURRENT USE OF INSULIN (HCC): Status: ACTIVE | Noted: 2020-11-16

## 2020-11-16 LAB
ANION GAP SERPL CALCULATED.3IONS-SCNC: 10 MMOL/L (ref 3–16)
BUN BLDV-MCNC: 10 MG/DL (ref 7–20)
CALCIUM SERPL-MCNC: 8.9 MG/DL (ref 8.3–10.6)
CHLORIDE BLD-SCNC: 101 MMOL/L (ref 99–110)
CO2: 25 MMOL/L (ref 21–32)
CREAT SERPL-MCNC: <0.5 MG/DL (ref 0.6–1.1)
CREATININE URINE: 147.9 MG/DL (ref 28–259)
GFR AFRICAN AMERICAN: >60
GFR NON-AFRICAN AMERICAN: >60
GLUCOSE BLD-MCNC: 119 MG/DL (ref 70–99)
MICROALBUMIN UR-MCNC: 1.4 MG/DL
MICROALBUMIN/CREAT UR-RTO: 9.5 MG/G (ref 0–30)
POTASSIUM SERPL-SCNC: 3.8 MMOL/L (ref 3.5–5.1)
SODIUM BLD-SCNC: 136 MMOL/L (ref 136–145)

## 2020-11-16 PROCEDURE — 3052F HG A1C>EQUAL 8.0%<EQUAL 9.0%: CPT | Performed by: NURSE PRACTITIONER

## 2020-11-16 PROCEDURE — 36415 COLL VENOUS BLD VENIPUNCTURE: CPT | Performed by: NURSE PRACTITIONER

## 2020-11-16 PROCEDURE — 99213 OFFICE O/P EST LOW 20 MIN: CPT | Performed by: NURSE PRACTITIONER

## 2020-11-16 RX ORDER — AMITRIPTYLINE HYDROCHLORIDE 50 MG/1
TABLET, FILM COATED ORAL
Qty: 60 TABLET | Refills: 5 | Status: SHIPPED | OUTPATIENT
Start: 2020-11-16 | End: 2021-05-20 | Stop reason: SDUPTHER

## 2020-11-16 ASSESSMENT — ENCOUNTER SYMPTOMS
GASTROINTESTINAL NEGATIVE: 1
RESPIRATORY NEGATIVE: 1

## 2020-11-16 NOTE — PROGRESS NOTES
2020 SD Urrutia (:  1980) is a 45 y.o. female, here for evaluation of the following medical concerns:    HPI   Patient presents today to follow up on diabetes and insomnia. Diabetes- she brought in her blood glucose readings. Mostly in the mornings her blood sugars are running less than 120. After eating in the evening most readings are less than 150. She states she is tolerating the metformin okay. The diarrhea has gotten better. Insomnia- she increased her amitriptyline to 2 tablets nightly and she states this has been better. Review of Systems   Constitutional: Positive for fatigue. HENT: Negative. Respiratory: Negative. Cardiovascular: Negative. Gastrointestinal: Negative. Musculoskeletal: Negative. Skin: Negative. Neurological: Negative. Negative for dizziness and headaches. Psychiatric/Behavioral: Negative. Prior to Visit Medications    Medication Sig Taking? Authorizing Provider   metFORMIN (GLUCOPHAGE) 500 MG tablet Take 1 tablet by mouth 2 times daily (with meals) Yes JORDYN Brooks CNP   amitriptyline (ELAVIL) 50 MG tablet Take 2 tablets by mouth nightly Yes JORDYN Aparicio CNP   blood glucose test strips (ONETOUCH ULTRA) strip TEST 2 TIMES EVERY DAY AND AS NEEDED FOR SYMPTOMS OF IRREGULAR BLOOD GLUCOSE Yes JORDYN Brooks CNP   Alcohol Swabs PADS To check glucose 2  Times daily Yes JORDYN Brooks CNP   blood glucose monitor kit and supplies Test 2 times a day & as needed for symptoms of irregular blood glucose.  Yes JORDYN Brooks CNP   Lancets MISC To test glucose 2 times daily Yes JORDYN Brooks CNP   citalopram (CELEXA) 40 MG tablet Take 1 tablet by mouth daily Yes JORDYN Aparicio CNP   albuterol sulfate  (90 Base) MCG/ACT inhaler Inhale 2 puffs into the lungs every 6 hours as needed for Wheezing Yes JORDYN Brooks CNP   norethindrone-ethinyl estradiol ([place] FE 1/20) 1-20 MG-MCG per tablet Take 1 tablet by mouth daily Yes Historical Provider, MD   zolpidem (AMBIEN) 5 MG tablet Take 5 mg by mouth nightly as needed for Sleep. Historical Provider, MD   hydrocortisone 2.5 % cream Apply topically 2 times daily. Patient not taking: Reported on 11/16/2020  JORDYN Garcia CNP        Social History     Tobacco Use    Smoking status: Never Smoker    Smokeless tobacco: Never Used   Substance Use Topics    Alcohol use: No     Comment: 1-2 X per year        Vitals:    11/16/20 1055 11/16/20 1111   BP: (!) 130/94 130/88   Pulse: 88    Temp: 97.7 °F (36.5 °C)    TempSrc: Temporal    SpO2: 97%    Weight: 214 lb (97.1 kg)      Estimated body mass index is 31.59 kg/m² as calculated from the following:    Height as of 5/18/20: 5' 9.02\" (1.753 m). Weight as of this encounter: 214 lb (97.1 kg). Physical Exam  Vitals signs reviewed. Cardiovascular:      Rate and Rhythm: Normal rate and regular rhythm. Heart sounds: Normal heart sounds. Pulmonary:      Breath sounds: Normal breath sounds. Skin:     General: Skin is warm and dry. Neurological:      Mental Status: She is alert and oriented to person, place, and time. ASSESSMENT/PLAN:  1. Type 2 diabetes mellitus without complication, without long-term current use of insulin ([facility]  Will recheck blood work and refill of metformin sent. - Hemoglobin G2E  - Basic Metabolic Panel  - metFORMIN (GLUCOPHAGE) 500 MG tablet; Take 1 tablet by mouth 2 times daily (with meals)  Dispense: 60 tablet; Refill: 5  - Microalbumin / Creatinine Urine Ratio    2. Insomnia, unspecified type  Continue with 2 tablets nightly. - amitriptyline (ELAVIL) 50 MG tablet; Take 2 tablets by mouth nightly  Dispense: 60 tablet; Refill: 5          An electronic signature was used to authenticate this note.     --JORDYN Garcia CNP on 11/16/2020 at 2:22 PM

## 2020-11-17 ENCOUNTER — TELEPHONE (OUTPATIENT)
Dept: FAMILY MEDICINE CLINIC | Age: 40
End: 2020-11-17

## 2020-11-17 LAB
ESTIMATED AVERAGE GLUCOSE: 111.2 MG/DL
HBA1C MFR BLD: 5.5 %

## 2020-11-17 NOTE — TELEPHONE ENCOUNTER
----- Message from JORDYN Wilkes CNP sent at 11/17/2020 11:05 AM EST -----  Microalbumin is normal. A1C is much improved at 5.5. continue current medications and diet changes.  Kidney function is normal.

## 2021-02-26 RX ORDER — CITALOPRAM 40 MG/1
40 TABLET ORAL DAILY
Qty: 30 TABLET | Refills: 5 | Status: SHIPPED | OUTPATIENT
Start: 2021-02-26

## 2021-05-20 ENCOUNTER — TELEPHONE (OUTPATIENT)
Dept: FAMILY MEDICINE CLINIC | Age: 41
End: 2021-05-20

## 2021-05-20 DIAGNOSIS — G47.00 INSOMNIA, UNSPECIFIED TYPE: ICD-10-CM

## 2021-05-20 RX ORDER — AMITRIPTYLINE HYDROCHLORIDE 50 MG/1
TABLET, FILM COATED ORAL
Qty: 60 TABLET | Refills: 5 | Status: SHIPPED | OUTPATIENT
Start: 2021-05-20

## 2021-05-20 NOTE — TELEPHONE ENCOUNTER
Requesting Refill  amitriptyline (ELAVIL) 50 MG tablet     Last Office Visit  -  11/16/20  Next Office Visit  -  none    Last Filled  -    Last UDS -    Contract -

## 2021-05-20 NOTE — TELEPHONE ENCOUNTER
Last Office Visit  -  11/16/20  Next Office Visit  -  none    Last Filled  -  11/16/20  Last UDS -  n/a  Contract -  n/a

## 2021-06-21 RX ORDER — BLOOD SUGAR DIAGNOSTIC
STRIP MISCELLANEOUS
Qty: 100 STRIP | Refills: 2 | Status: SHIPPED | OUTPATIENT
Start: 2021-06-21

## 2021-06-22 DIAGNOSIS — E11.9 TYPE 2 DIABETES MELLITUS WITHOUT COMPLICATION, WITHOUT LONG-TERM CURRENT USE OF INSULIN (HCC): ICD-10-CM
